# Patient Record
Sex: FEMALE | Race: WHITE
[De-identification: names, ages, dates, MRNs, and addresses within clinical notes are randomized per-mention and may not be internally consistent; named-entity substitution may affect disease eponyms.]

---

## 2018-07-09 LAB
BUN BLD-MCNC: 24 MG/DL (ref 7–18)
GLUCOSE SERPLBLD-MCNC: 86 MG/DL (ref 74–106)
HCT VFR BLD CALC: 39.7 % (ref 36–45)
INR BLD: 0.98
LYMPHOCYTES # SPEC AUTO: 2.3 K/UL (ref 0.7–4.9)
MCH RBC QN AUTO: 30.9 PG (ref 27–35)
MCV RBC: 93.4 FL (ref 80–100)
PMV BLD: 8.4 FL (ref 7.6–11.3)
POTASSIUM SERPL-SCNC: 4.1 MMOL/L (ref 3.5–5.1)
RBC # BLD: 4.25 M/UL (ref 3.86–4.86)

## 2018-07-09 NOTE — RAD REPORT
EXAM DESCRIPTION:  El Schroeder (2 Views)7/9/2018 3:57 pm

 

CLINICAL HISTORY:  Hypertension

 

COMPARISON:  November 2017

 

FINDINGS:   The lungs appear clear of acute infiltrate. The heart is normal size

 

IMPRESSION:   No acute abnormalities displayed

## 2018-07-10 ENCOUNTER — HOSPITAL ENCOUNTER (OUTPATIENT)
Dept: HOSPITAL 97 - CCL | Age: 73
LOS: 1 days | Discharge: HOME | End: 2018-07-11
Attending: INTERNAL MEDICINE
Payer: COMMERCIAL

## 2018-07-10 DIAGNOSIS — E78.5: ICD-10-CM

## 2018-07-10 DIAGNOSIS — I10: ICD-10-CM

## 2018-07-10 DIAGNOSIS — I25.10: Primary | ICD-10-CM

## 2018-07-10 DIAGNOSIS — E78.6: ICD-10-CM

## 2018-07-10 PROCEDURE — 36415 COLL VENOUS BLD VENIPUNCTURE: CPT

## 2018-07-10 PROCEDURE — 85025 COMPLETE CBC W/AUTO DIFF WBC: CPT

## 2018-07-10 PROCEDURE — 85610 PROTHROMBIN TIME: CPT

## 2018-07-10 PROCEDURE — 71046 X-RAY EXAM CHEST 2 VIEWS: CPT

## 2018-07-10 PROCEDURE — 93454 CORONARY ARTERY ANGIO S&I: CPT

## 2018-07-10 PROCEDURE — 85730 THROMBOPLASTIN TIME PARTIAL: CPT

## 2018-07-10 PROCEDURE — 80048 BASIC METABOLIC PNL TOTAL CA: CPT

## 2018-07-10 PROCEDURE — 85347 COAGULATION TIME ACTIVATED: CPT

## 2018-07-10 PROCEDURE — 93005 ELECTROCARDIOGRAM TRACING: CPT

## 2018-07-10 RX ADMIN — Medication SCH ML: at 20:50

## 2018-07-10 NOTE — OP
Surgeon:  Arnoldo Guadarrama MD



The patient admitted to my service as an outpatient for a left heart catheterization.



Procedures:  Left heart catheterization, selective coronary arteriogram, and primary left anterior de
scending stent.



Indication For The Procedure:  Chest pain and positive Cardiolite. 



The patient is a 73-year-old woman.  She has a history of hypertension, dyslipidemia, admitted for an
 outpatient heart catheterization.



Description Of Procedure:  She was brought to the cath lab as an outpatient, prepped and draped in th
e routine sterile fashion, and given 2 mg of Versed for IV sedation.  A right common femoral artery a
ccess was obtained with a 6-Kazakh sheath and Angio-Seal was used to close the case.  Angiography the
re was normal.  Diagnostic catheterization with left and right Larry catheter revealed a normal cir
cumflex, normal RCA.  She had an 80% long mid LAD stenosis.  This was consistent with her positive Ca
rdiolite.  An XBLAD 3.5 guide with side-hole was used.  A [place] wire 0.014 exchange length was used 
to cross the lesion.  A 2.5 x 16 Synergy stent was placed at 14 atmospheres over 30 seconds.  There w
as 0% residual, excellent results. No complication.



Estimated Blood Loss:  5 cc.



Postoperative Diagnosis:  Coronary artery disease, status post successful primary mid left anterior d
escending stent 



The plan is to continue her medical therapy at home, which does include pravastatin at 80 mg daily.  
The patient did receive Angiomax during the procedure and Effient as well as aspirin.  She will stay 
overnight and go home tomorrow morning.



Operators:  Arnoldo Guadarrama M.D. and Cathi Roblero. 



Total conscious sedation was 45 minutes.





NB/GO

DD:  07/10/2018 08:10:51Voice ID:  [id number]

DT:  07/10/2018 19:37:43Report ID:  [id number]

## 2018-07-11 LAB
BUN BLD-MCNC: 23 MG/DL (ref 7–18)
GLUCOSE SERPLBLD-MCNC: 101 MG/DL (ref 74–106)
HCT VFR BLD CALC: 35.3 % (ref 36–45)
LYMPHOCYTES # SPEC AUTO: 2.3 K/UL (ref 0.7–4.9)
MCH RBC QN AUTO: 31.5 PG (ref 27–35)
MCV RBC: 92 FL (ref 80–100)
PMV BLD: 7.8 FL (ref 7.6–11.3)
POTASSIUM SERPL-SCNC: 3.9 MMOL/L (ref 3.5–5.1)
RBC # BLD: 3.84 M/UL (ref 3.86–4.86)

## 2018-07-11 RX ADMIN — Medication SCH ML: at 08:57

## 2018-07-11 NOTE — EKG
Test Date:    2018-07-11               Test Time:    08:37:41

Technician:                                          

                                                     

MEASUREMENT RESULTS:                                       

Intervals:                                           

Rate:         64                                     

IA:           168                                    

QRSD:         82                                     

QT:           402                                    

QTc:          414                                    

Axis:                                                

P:            45                                     

IA:           168                                    

QRS:          11                                     

T:            47                                     

                                                     

INTERPRETIVE STATEMENTS:                                       

                                                     

Normal sinus rhythm

Low voltage QRS

Otherwise normal ECG

Compared to ECG 11/16/2017 10:41:55

Sinus bradycardia no longer present

criteria for anterior infarct are no longer present

Electronically Signed On 07-11-18 12:07:57 CDT by Koby Montague

## 2025-01-27 NOTE — RAD REPORT
EXAMINATION: US BILATERAL  LOWER EXTREMITY VENOUS DOPPLER



CLINICAL INDICATION: Pain;Swelling



TECHNIQUE: Complete bilateral duplex sonography of the BILATERAL lower extremity veins was performed.
 The examination included compression for vein patency, color Doppler imaging and flow augmentation

in response to distal compression of the distal external iliac, common femoral, femoral, popliteal, t
ibial, and great and small saphenous veins. 



COMPARISON: No prior exam. 



FINDINGS:



Duplex sonography testing of the veins of the BILATERAL lower extremity was performed. Color flow aubrey
ging shows all veins to be compressible with [id number] color filling. Pulsatile and phasic flow

is present within all lower extremity deep and superficial veins examined. 





IMPRESSION: There is no deep vein or superficial vein thrombosis. 







Reported By: Nick Jay 

Electronically Signed:  1/27/2025 10:26 AM

## 2025-01-27 NOTE — RAD REPORT
EXAMINATION: ONE VIEW CHEST XR



CLINICAL INDICATION: COUGH



TECHNIQUE: Frontal chest projection is submitted. Examination is limited by patient positioning and t
echnique.



COMPARISON: 7/9/2018



FINDINGS:



The lungs are well inflated and clear. The heart is upper limit of normal in size. No displaced fract
ures identified.



IMPRESSION: 



No acute intrathoracic abnormalities. 







Reported By: Nick Jay 

Electronically Signed:  1/27/2025 10:16 AM

## 2025-01-27 NOTE — ER
Nurse's Notes                                                                                     

 [facility]                                                                 

Name: Richard Chirinos                                                                             

Age: 79 yrs                                                                                       

Sex: Female                                                                                       

: 1945                                                                                   

MRN: [id number]                                                                                   

Arrival Date: 2025                                                                          

Time: 09:46                                                                                       

Account#: [id number]                                                                            

Bed 20                                                                                            

Private MD:                                                                                       

Diagnosis: Edema, unspecified;Chronic kidney disease, unspecified;UTI/ Urinary tract infection,   

  site not specified                                                                              

                                                                                                  

Presentation:                                                                                     

                                                                                             

11:25 Chief complaint: Patient states: she noticed yesterday that both legs were blue in      ap3 

      color and swollen. patient reports the legs were not painful. Coronavirus screen: At        

      this time, the client does not indicate any symptoms associated with coronavirus-19.        

      [place] Screen: No symptoms or risks identified at this time. Initial Sepsis Screen: Does     

      the patient meet any 2 criteria? HR > 90 bpm. Does the patient have a suspected source      

      of infection? No. Patient's initial sepsis screen is negative. Risk Assessment: Do you      

      want to hurt yourself or someone else? Patient reports no desire to harm self or            

      others. Onset of symptoms was 2025.                                             

11:25 Method Of Arrival: Wheelchair                                                           ap3 

11:25 Acuity: JONO 3                                                                           ap3 

                                                                                                  

Triage Assessment:                                                                                

: General: Appears in no apparent distress. Behavior is calm, cooperative, appropriate    ap3 

      for age. Pain: Denies pain. Neuro: Level of Consciousness is awake, alert, obeys            

      commands, Oriented to person, place, time, situation. Cardiovascular: Pulses are            

      palpable in right dorsalis pedis artery and left dorsalis pedis artery. Respiratory:        

      Airway is patent Respiratory effort is even, unlabored, Respiratory pattern is regular,     

      symmetrical.                                                                                

                                                                                                  

Historical:                                                                                       

- Allergies:                                                                                      

: No Known Allergies;                                                                     ap3 

- PMHx:                                                                                           

: Hypercholesterolemia; Hypertension; "pre-diabetic" (Hypertension);                      ap3 

                                                                                                  

- Immunization history:: Client reports having NOT received the Covid vaccine. Flu                

  vaccine is up to date.                                                                          

- Infectious Disease History:: Denies.                                                            

- Social history:: Smoking status: Patient/guardian denies using tobacco, the patient             

  reports quitting approximately 20 years ago.                                                    

- Family history:: not pertinent.                                                                 

                                                                                                  

                                                                                                  

Screenin:28 [facility] ED Fall Risk Assessment (Adult) History of falling in the last 3 months,       ap3 

      including since admission No falls in past 3 months (0 pts) Confusion or Disorientation     

      No (0 pts) Intoxicated or Sedated No (0 pts) Impaired Gait Yes (1 pt) Mobility Assist       

      Device Used Yes (1 pt) Altered Elimination No (0 pt) Score/Fall Risk Level 0 - 2 = Low      

      Risk Oriented to surroundings, Maintained a safe environment, Educated pt \T\ family on     

      fall prevention, incl call for assistance when getting out of bed, Assessed \T\             

      reinforced patient's understanding of fall precautions, Hourly rounding (assess needs \T\   

      fall precautionary measures) done, Used ambulatory aids as needed (educated on \T\          

      assisted with), Used gait belt as appropriate. Abuse screen: Denies threats or abuse.       

      Nutritional screening: No deficits noted. Tuberculosis screening: No symptoms or risk       

      factors identified.                                                                         

                                                                                                  

Assessment:                                                                                       

12:10 General: Appears in no apparent distress. Behavior is calm, cooperative. Pain: Denies   kj2 

      pain. Neuro: Level of Consciousness is awake, alert, obeys commands, Oriented to            

      person, place, time, situation. Cardiovascular: Patient's skin is warm and dry.             

      Respiratory: Airway is patent Respiratory effort is unlabored. GI: No deficits noted.       

      : No deficits noted.                                                                      

13:23 Reassessment: Patient appears in no apparent distress at this time. Patient and/or      kj2 

      family updated on plan of care and expected duration. Pain level reassessed. Patient is     

      alert, oriented x 3, equal unlabored respirations, skin warm/dry/pink.                      

                                                                                                  

Vital Signs:                                                                                      

11:25  / 75; Pulse 95; Resp 18; Temp 98.5(O); Pulse Ox 96% on R/A; Weight 92.99 kg;     ap3 

      Height 5 ft. 8 in. ; Pain 0/10;                                                             

12:10  / 79; Pulse 75; Resp 20; Pulse Ox 97% ;                                          kj2 

13:14  / 71; Pulse 66; Resp 18; Pulse Ox 98% ;                                          kj2 

11:25 Body Mass Index 31.17 (92.99 kg, 172.72 cm)                                             ap3 

11:25 Pain Scale: Adult                                                                       ap3 

                                                                                                  

ED Course:                                                                                        

09:54 Patient arrived in ED.                                                                  ra3 

09:56 Raudel Jimenez MD is Attending Physician.                                             kj 

10:11 XRAY Chest (1 view) In Process Unspecified.                                             EDMS

10:26 US Extremity Venous W Compression Rene In Process Unspecified.                           EDMS

10:39 Lipase Sent.                                                                            bc6 

10:39 Basic Metabolic Panel Sent.                                                             bc6 

10:39 CBC with Diff Sent.                                                                     bc6 

10:39 LFT's Sent.                                                                             bc6 

10:39 Magnesium Sent.                                                                         bc6 

10:39 NT PRO-BNP Sent.                                                                        bc6 

10:39 PT-INR Sent.                                                                            bc6 

10:39 Troponin HS Sent.                                                                       bc6 

10:39 Initial lab(s) drawn, by me, sent to lab. Inserted saline lock: 20 gauge in right       bc6 

      antecubital area, using aseptic technique. Blood collected. Flushed with 10 mL NS.          

10:58 EKG done, by ED staff, reviewed by Raudel Jimenez MD.                                   bc6 

11:27 Triage completed.                                                                       ap3 

11:28 Arm band placed on right wrist.                                                         ap3 

11:31 Urine Culture Sent.                                                                     bc6 

12:22 Cathy Manjarrez, BLANQUITA is Primary Nurse.                                                   kj2 

12:35 Patient has correct armband on for positive identification. Bed in low position. Call   kj2 

      light in reach. Adult w/ patient. Provided Education on: call light.                        

13:06 Анрдей Germani MD is Referral Physician.                                               [facility] 

13:06 Marisa Francisco MD is Referral Physician.                                              [facility] 

13:28 No provider procedures requiring assistance completed. IV discontinued, intact,         kj2 

      bleeding controlled, No redness/swelling at site. Pressure dressing applied.                

                                                                                                  

Administered Medications:                                                                         

13:05 Drug: Rocephin IV 1 grams IV at per protocol once; Given slow IV push per pharmacy      kj2 

      instructions Route: IV; Rate: per protocol; Site: right antecubital;                        

13:36 Follow up: IV Status: Completed infusion; IV Intake: 10ml                               kj2 

13:14 Drug: Ciprofloxacin  mg PO once Route: PO;                                        kj2 

13:36 Follow up: Response: No adverse reaction                                                kj2 

                                                                                                  

                                                                                                  

Medication:                                                                                       

13:28 VIS not applicable for this client.                                                     kj2 

                                                                                                  

Intake:                                                                                           

13:36 IV: 10ml; Total: 10ml.                                                                  kj2 

                                                                                                  

Outcome:                                                                                          

13:07 Discharge ordered by MD.                                                                kj 

13:28 Discharged to home ambulatory,                                                          kj2 

13:28 Condition: stable                                                                           

13:28 Discharge instructions given to patient, Instructed on discharge instructions, follow       

      up and referral plans. medication usage, Demonstrated understanding of instructions,        

      follow-up care, medications,                                                                

13:34 Patient left the ED.                                                                    kj2 

                                                                                                  

Signatures:                                                                                       

Dispatcher [employer]                           EDRaudel Rivers MD MD cha Prokisch, Amanda RN                    RN   ap3                                                  

Anna Pratt                           bc6                                                  

Hanane Davis                                   ra3                                                  

Cathy Manjarrez, BLANQUITA                     RN   kj2                                                  

                                                                                                  

**************************************************************************************************

## 2025-01-27 NOTE — EDPHYS
Physician Documentation                                                                           

 [facility]                                                                 

Name: Richard Chirinos                                                                             

Age: 79 yrs                                                                                       

Sex: Female                                                                                       

: 1945                                                                                   

MRN: [id number]                                                                                   

Arrival Date: 2025                                                                          

Time: 09:46                                                                                       

Account#: [id number]                                                                            

Bed 20                                                                                            

Private MD:                                                                                       

ED Physician Raudel Jimenez                                                                      

HPI:                                                                                              

                                                                                             

13:02 This 79 yrs old  Female presents to ER via Wheelchair with complaints of Leg   kj 

      Swelling - BL: Sent by pcp.                                                                 

13:02 The patient presents with pain, swelling. The complaints affect the right leg and left  kj 

      leg. Context: resulted from an unknown cause. Onset: The symptoms/episode                   

      began/occurred 2 day(s) ago. Modifying factors: The symptoms are alleviated by nothing.     

      the symptoms are aggravated by nothing. Associated signs and symptoms: The patient has      

      no apparent associated signs or symptoms. Treatment prior to arrival includes: no           

      previous treatment. Severity of symptoms: At their worst the symptoms were mild, in the     

      emergency department the symptoms are unchanged. The patient has not experienced            

      similar symptoms in the past.                                                               

                                                                                                  

Historical:                                                                                       

- Allergies:                                                                                      

: No Known Allergies;                                                                     ap3 

- PMHx:                                                                                           

: Hypercholesterolemia; Hypertension; "pre-diabetic" (Hypertension);                      ap3 

                                                                                                  

- Immunization history:: Client reports having NOT received the Covid vaccine. Flu                

  vaccine is up to date.                                                                          

- Infectious Disease History:: Denies.                                                            

- Social history:: Smoking status: Patient/guardian denies using tobacco, the patient             

  reports quitting approximately 20 years ago.                                                    

- Family history:: not pertinent.                                                                 

                                                                                                  

                                                                                                  

ROS:                                                                                              

13:02 Constitutional: Negative for fever, chills, and weight loss, Eyes: Negative for injury, kj 

      pain, redness, and discharge, ENT: Negative for injury, pain, and discharge, Neck:          

      Negative for injury, pain, and swelling, Cardiovascular: Negative for chest pain,           

      palpitations, and edema, Respiratory: Negative for shortness of breath, cough,              

      wheezing, and pleuritic chest pain, Abdomen/GI: Negative for abdominal pain, nausea,        

      vomiting, diarrhea, and constipation, Back: Negative for injury and pain, : Negative      

      for injury, bleeding, discharge, and swelling, Skin: Negative for injury, rash, and         

      discoloration, Neuro: Negative for headache, weakness, numbness, tingling, and seizure,     

      Psych: Negative for depression, anxiety, suicide ideation, homicidal ideation, and          

      hallucinations, Allergy/Immunology: Negative for hives, rash, and allergies, Endocrine:     

      Negative for neck swelling, polydipsia, polyuria, polyphagia, and marked weight             

      changes, Hematologic/Lymphatic: Negative for swollen nodes, abnormal bleeding, and          

      unusual bruising,                                                                           

13:02 MS/extremity: Positive for pain, swelling, tenderness, of the right leg and left leg,       

                                                                                                  

Exam:                                                                                             

13:02 Constitutional:  This is a well developed, well nourished patient who is awake, alert,  kj 

      and in no acute distress. Head/Face:  Normocephalic, atraumatic. Eyes:  Pupils equal        

      round and reactive to light, extra-ocular motions intact.  Lids and lashes normal.          

      Conjunctiva and sclera are non-icteric and not injected.  Cornea within normal limits.      

      Periorbital areas with no swelling, redness, or edema. ENT:  Nares patent. No nasal         

      discharge, no septal abnormalities noted.  Tympanic membranes are normal and external       

      auditory canals are clear.  Oropharynx with no redness, swelling, or masses, exudates,      

      or evidence of obstruction, uvula midline.  Mucous membranes moist. Neck:  Trachea          

      midline, no thyromegaly or masses palpated, and no cervical lymphadenopathy.  Supple,       

      full range of motion without nuchal rigidity, or vertebral point tenderness.  No            

      Meningismus. Chest/axilla:  Normal chest wall appearance and motion.  Nontender with no     

      deformity.  No lesions are appreciated. Cardiovascular:  Regular rate and rhythm with a     

      normal S1 and S2.  No gallops, murmurs, or rubs.  Normal PMI, no JVD.  No pulse             

      deficits. Respiratory:  Lungs have equal breath sounds bilaterally, clear to                

      auscultation and percussion.  No rales, rhonchi or wheezes noted.  No increased work of     

      breathing, no retractions or nasal flaring. Abdomen/GI:  Soft, non-tender, with normal      

      bowel sounds.  No distension or tympany.  No guarding or rebound.  No evidence of           

      tenderness throughout. Back:  No spinal tenderness.  No costovertebral tenderness.          

      Full range of motion. Skin:  Warm, dry with normal turgor.  Normal color with no            

      rashes, no lesions, and no evidence of cellulitis. Neuro:  Awake and alert, GCS 15,         

      oriented to person, place, time, and situation.  Cranial nerves II-XII grossly intact.      

      Motor strength 5/5 in all extremities.  Sensory grossly intact.  Cerebellar exam            

      normal.  Normal gait. Psych:  Awake, alert, with orientation to person, place and time.     

       Behavior, mood, and affect are within normal limits.                                       

13:02 Musculoskeletal/extremity: ROM: intact in all extremities, full active range of motion,     

      full passive range of motion, Circulation is intact in all extremities. Sensation           

      intact. Compartment Syndrome exam of affected extremity: is normal. Joints: All joints      

      appear normal with full range of motion. Weight bearing: able to fully bear weight, DVT     

      Exam: No signs of deep vein thrombosis. no pain, no swelling, no tenderness, negative       

      Homans' sign noted on exam, no appreciated bluish discoloration, no erythema, no            

      increased warmth,                                                                           

13:08 ECG was reviewed by the Attending Physician.                                            [facility] 

                                                                                                  

Vital Signs:                                                                                      

11:25  / 75; Pulse 95; Resp 18; Temp 98.5(O); Pulse Ox 96% on R/A; Weight 92.99 kg;     ap3 

      Height 5 ft. 8 in. ; Pain 0/10;                                                             

12:10  / 79; Pulse 75; Resp 20; Pulse Ox 97% ;                                          kj2 

13:14  / 71; Pulse 66; Resp 18; Pulse Ox 98% ;                                          kj2 

11:25 Body Mass Index 31.17 (92.99 kg, 172.72 cm)                                             ap3 

11:25 Pain Scale: Adult                                                                       ap3 

                                                                                                  

MDM:                                                                                              

09:56 Medical Screening Exam initiated                                                        [facility] 

11:30 Medical Screening Exam initiated                                                        [facility] 

13:04 Differential diagnosis: contusion, abrasion, tendonitis. Data reviewed: vital signs,    [facility] 

      nurses notes, lab test result(s), EKG, radiologic studies. Consideration of                 

      Admission/Observation Escalation of care including admission/observation considered. I      

      considered the following discharge prescriptions or medication management in the            

      emergency department Medications were administered in the Emergency Department. See         

      MAR. Independent interpretation of the following test(s) in the Emergency Department        

      EKG: See my EKG interpretation above. Test considered but Not performed: CT: NO CT          

      ABDOM/PEL. Historians other than the Patient: PT WELL INFORMED. Care significantly          

      affected by the following chronic conditions: Diabetes, Hypertension, Obesity.              

      Counseling: I had a detailed discussion with the patient and/or guardian regarding the      

      historical points, exam findings, and any diagnostic results supporting the                 

      discharge/admit diagnosis, lab results, radiology results, the need for outpatient          

      follow up.                                                                                  

                                                                                                  

                                                                                             

09:57 Order name: Basic Metabolic Panel; Complete Time: 12:47                                 [facility] 

                                                                                             

09:57 Order name: CBC with Diff; Complete Time: 12:47                                         [facility] 

                                                                                             

09:57 Order name: LFT's; Complete Time: 12:47                                                 [facility] 

                                                                                             

09:57 Order name: Magnesium; Complete Time: 12:47                                             [facility] 

                                                                                             

09:57 Order name: NT PRO-BNP; Complete Time: 12:47                                            [facility] 

                                                                                             

09:57 Order name: PT-INR; Complete Time: 12:47                                                [facility] 

                                                                                             

09:57 Order name: Troponin HS; Complete Time: 12:47                                           [facility] 

                                                                                             

09:57 Order name: Urinalysis w/ reflexes; Complete Time: 12:47                                [facility] 

                                                                                             

09:57 Order name: Lipase; Complete Time: 12:47                                                [facility] 

                                                                                             

11:18 Order name: Urine Culture                                                               [facility]

                                                                                             

09:57 Order name: XRAY Chest (1 view); Complete Time: 12:47                                   [facility] 

                                                                                             

09:57 Order name: US Extremity Venous W Compression Rene; Complete Time: 12:47                 [facility] 

                                                                                             

09:57 Order name: Cardiac monitoring; Complete Time: 12:53                                    [facility] 

                                                                                             

09:57 Order name: EKG - Nurse/Tech; Complete Time: 10:57                                      [facility] 

                                                                                             

09:57 Order name: IV Saline Lock; Complete Time: 10:39                                        [facility] 

                                                                                             

09:57 Order name: Labs collected and sent; Complete Time: 10:39                               [facility] 

                                                                                             

09:57 Order name: O2 Per Protocol; Complete Time: 12:53                                       [facility] 

                                                                                             

09:57 Order name: O2 Sat Monitoring; Complete Time: 12:53                                     [facility] 

                                                                                                  

EC:08 Rate is 94 beats/min. Rhythm is regular. QRS Axis is Normal. HI interval is normal. QRS kj 

      interval is normal. QT interval is normal. No Q waves. T waves are Normal. No ST            

      changes noted. Clinical impression: NSR w/ Non-specific ST/T Changes and No evidence of     

      ischemia. Interpreted by me. Reviewed by me.                                                

                                                                                                  

Administered Medications:                                                                         

13:05 Drug: Rocephin IV 1 grams IV at per protocol once; Given slow IV push per pharmacy      kj2 

      instructions Route: IV; Rate: per protocol; Site: right antecubital;                        

13:36 Follow up: IV Status: Completed infusion; IV Intake: 10ml                               kj2 

13:14 Drug: Ciprofloxacin  mg PO once Route: PO;                                        kj2 

13:36 Follow up: Response: No adverse reaction                                                kj2 

                                                                                                  

                                                                                                  

Disposition Summary:                                                                              

25 13:07                                                                                    

Discharge Ordered                                                                                 

 Notes:       Location: Home                                                                        
  kj

      Problem: new                                                                            kj 

      Symptoms: have improved                                                                 kj 

      Condition: Stable                                                                       kj 

      Diagnosis                                                                                   

        - Edema, unspecified                                                                  kj 

        - Chronic kidney disease, unspecified                                                 kj 

        - UTI/ Urinary tract infection, site not specified                                    kj 

      Followup:                                                                               kj 

        - With: Private Physician                                                                  

        - When: 2 - 3 days                                                                         

        - Reason: Recheck today's complaints, Continuance of care, Re-evaluation by your           

      physician                                                                                   

      Followup:                                                                               kj 

        - With: Андрей Germain MD                                                                 

        - When: 2 - 3 days                                                                         

        - Reason: Recheck today's complaints, Re-evaluation by your physician                      

      Followup:                                                                               kj 

        - With: Marisa Francisco MD                                                                

        - When: 2 - 3 days                                                                         

        - Reason: Recheck today's complaints, Re-evaluation by your physician                      

      Discharge Instructions:                                                                     

        - Discharge Summary Sheet                                                             kj 

        - Dysuria                                                                             kj 

        - Edema                                                                               kj 

        - Urinary Tract Infection, Adult                                                      kj 

        - Food Basics for Chronic Kidney Disease                                              kj 

        - Urinary Tract Infection, Adult, Easy-to-Read                                        kj 

        - Edema, Easy-to-Read                                                                 kj 

        - Chronic Kidney Disease, Adult, Easy-to-Read                                         kj 

      Forms:                                                                                      

        - Medication Reconciliation Form                                                      kj 

        - Antibiotic Education                                                                kj 

        - Prescription Opioid Use                                                             kj 

        - Patient Portal Instructions                                                         [facility] 

        - Leadership Thank You Letter                                                         [facility] 

      Prescriptions:                                                                              

        - Cipro 250 mg Oral tablet                                                                 

            - take 1 tablet ORAL route every 12 hours; 14 tablet; Refills: 0, Product         kj 

      Selection Permitted                                                                         

Signatures:                                                                                       

Dispatcher [employer]                           Raudel Segovia MD MD cha Prokisch, Amanda RN                    RN   ap3                                                  

Cathy Manjarrez RN                     RN   kj2                                                  

                                                                                                  

Corrections: (The following items were deleted from the chart)                                    

09:57 09:57 BASIC METABOLIC PANEL+C.LAB.BRZ ordered. EDMS                                     EDMS

09:57 09:57 CBC+H.LAB.BRZ ordered. EDMS                                                       EDMS

09:57 09:57 HEPATIC FUNCTION+C.LAB.BRZ ordered. EDMS                                          EDMS

09:57 09:57 MAGNESIUM+C.LAB.BRZ ordered. EDMS                                                 EDMS

09:57 09:57 PROBNP+C.LAB.BRZ ordered. EDMS                                                    EDMS

09:57 09:57 PROTIME (+INR)+COAG.LAB.BRZ ordered. EDMS                                         EDMS

09:57 09:57 Troponin High Sensitivity+C.LAB.BRZ ordered. EDMS                                 EDMS

09:57 09:57 Urinalysis+U.LAB.BRZ ordered. EDMS                                                EDMS

09: 09:57 LIPASE+C.LAB.BRZ ordered. EDMS                                                    EDMS

09:58 09:58 Chest Single View+RAD.RAD.BRZ ordered. EDMS                                       EDMS

09: 09:58 Extrem Venous W Compression Rene+US.RAD.BRZ ordered. EDMS                          EDMS

                                                                                                  

**************************************************************************************************